# Patient Record
Sex: FEMALE | Race: BLACK OR AFRICAN AMERICAN | NOT HISPANIC OR LATINO | Employment: FULL TIME | URBAN - METROPOLITAN AREA
[De-identification: names, ages, dates, MRNs, and addresses within clinical notes are randomized per-mention and may not be internally consistent; named-entity substitution may affect disease eponyms.]

---

## 2020-12-08 ENCOUNTER — TRANSCRIBE ORDERS (OUTPATIENT)
Dept: PHYSICAL THERAPY | Facility: CLINIC | Age: 45
End: 2020-12-08

## 2020-12-08 DIAGNOSIS — M54.50 LOW BACK PAIN, UNSPECIFIED BACK PAIN LATERALITY, UNSPECIFIED CHRONICITY, UNSPECIFIED WHETHER SCIATICA PRESENT: Primary | ICD-10-CM

## 2020-12-08 DIAGNOSIS — M25.522 LEFT ELBOW PAIN: ICD-10-CM

## 2020-12-14 ENCOUNTER — TREATMENT (OUTPATIENT)
Dept: PHYSICAL THERAPY | Facility: CLINIC | Age: 45
End: 2020-12-14

## 2020-12-14 DIAGNOSIS — M25.522 LEFT ELBOW PAIN: ICD-10-CM

## 2020-12-14 DIAGNOSIS — M54.50 ACUTE BILATERAL LOW BACK PAIN WITHOUT SCIATICA: Primary | ICD-10-CM

## 2020-12-14 PROCEDURE — 97530 THERAPEUTIC ACTIVITIES: CPT | Performed by: PHYSICAL THERAPIST

## 2020-12-14 PROCEDURE — 97110 THERAPEUTIC EXERCISES: CPT | Performed by: PHYSICAL THERAPIST

## 2020-12-14 PROCEDURE — 97161 PT EVAL LOW COMPLEX 20 MIN: CPT | Performed by: PHYSICAL THERAPIST

## 2020-12-14 NOTE — PROGRESS NOTES
"Physical Therapy Initial Evaluation and Plan of Care    Patient: Anna Bull   : 1975  Diagnosis/ICD-10 Code:  Acute bilateral low back pain without sciatica [M54.5]  Referring practitioner: Mik Flowers MD    Subjective Evaluation    History of Present Illness  Date of onset: 12/3/2020  Mechanism of injury: (Patient's daughter is present to translate)  Patient slipped on oil as she was leaving work; she fell directly onto her bottom with her (L) elbow striking the ground. Patient presented to [facility] med a few hours later.  Xrays were taken of lumbar region which were negative.  She was told to apply ice to the (L) elbow.  Medication was prescribed which has been helpful.  She was placed on restrictions at work.  She states she worked light duty restrictions for one day only and was placed back \"on the line\" for her regular work. She is having difficulty sitting and standing for prolonged periods as she is required to for her job.    Patient reports a previous back surgery in 2019 but she is unable to describe the procedure; she indicates her upper thoracic region.      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  Location: (B) lumbar region, (B) proximal gluteals; (L) medial elbow, proximal forearm  Quality: \"strong\"  Relieving factors: medications and change in position  Aggravating factors: standing, ambulation, prolonged positioning, repetitive movement, lifting, movement, overhead activity, sleeping and stairs (having a difficult time finding a comfortable position, bending forward)  Progression: worsening    Social Support  Lives with: adult children and young children    Hand dominance: right    Diagnostic Tests  X-ray: normal    Patient " "Goals  Patient goals for therapy: decreased pain and return to work             Subjective Questionnaire: Oswestry: 20/50 = 40%; LEFS 17/80    Objective          Tenderness     Additional Tenderness Details  Mod (+) TTP (L) medial proximal forearm  Diffuse max (+) TTP (L) > (R) lumbar region including spinous processes, paraspinals    Active Range of Motion     Left Elbow   Flexion: 133 degrees with pain  Extension: 18 degrees   Forearm supination: 74 degrees with pain  Forearm pronation: 78 degrees     Right Elbow   Flexion: 147 degrees   Extension: 0 degrees   Forearm supination: 68 degrees   Forearm pronation: 81 degrees     Additional Active Range of Motion Details  Lumbar AROM (%):  Flexion 25% with pain  Right sidebend 25% with pain  Left sidebend 10% with pain (\"afraid to try\")  Right rotation 25% with pain  Left rotation 25% with pain      Strength/Myotome Testing     Left Hip   Planes of Motion   Flexion: 4-    Right Hip   Planes of Motion   Flexion: 4-    Left Knee   Flexion: 4-  Extension: 4    Right Knee   Flexion: 4  Extension: 4    Left Ankle/Foot   Dorsiflexion: 4+    Right Ankle/Foot   Dorsiflexion: 4+          Assessment & Plan     Assessment  Impairments: abnormal coordination, abnormal or restricted ROM, activity intolerance, lacks appropriate home exercise program and pain with function  Assessment details: Patient is a 45 y.o female who reports onset of lumbar and (L) elbow/forearm pain at work on 12/03/2020 when she slipped on oil.  She reports (L) > (R) lumbar symptoms rated 7/10 and (L) elbow/proximal FA symptoms 0-5/10.  Lumbar symptoms are worse with prolonged sitting, prolonged standing, walking, and bending while (L) elbow symptoms are worse with movement and lifting/carrying.  She exhibits diffuse lumbar and moderate (L) elbow/forearm tenderness, decreased and painful AROM lumbar > elbow, decreased strength, decreased positional tolerance, and increased time required to transition from " sitting to standing.  Her Oswestry score is 40% indicating a significant perceived level of physical disability.  She will benefit from skilled PT services to address these deficits and assist patient in painfree return to regular work tasks.  Prognosis: good  Functional Limitations: carrying objects, lifting, sleeping, walking, pulling, pushing, uncomfortable because of pain, moving in bed, sitting, standing, stooping and unable to perform repetitive tasks  Goals  Plan Goals: STGs: to be met in 2 weeks  1. Patient will be independent with initial HEP  2. Patient will report improved lumbar symptoms 3-5/10 and (L) elbow/forearm symptoms 0-3/10 for improved tolerance to ADLs  3. Patient will report consistently sleeping without waking more than twice due to pain interruption for improved restorative healing  4. Patient will demonstrate improved lumbar AROM >/= 50% all planes for improved trunk mobility  5. Patient will demonstrate full painfree AROM of (L) elbow/wrist/hand for improved functional use of (L) UE    LTGs: to be met in 4 weeks  1. Patient will be independent with progressed HEP  2. Patient will report improved lumbar symptoms 0-2/10 and resolution of (L) UE symptoms for normal ADL tolerance  3. Patient will demonstrate functional lumbar AROM and UE/LE strength for normal ADLs  4. Patient will ambulate short community distances without symptom exacerbation > 2/10  5. Patient will demonstrate readiness to RTW without restrictions    Plan  Therapy options: will be seen for skilled physical therapy services  Planned modality interventions: cryotherapy, thermotherapy (hydrocollator packs) and electrical stimulation/Russian stimulation  Planned therapy interventions: ADL retraining, body mechanics training, flexibility, functional ROM exercises, home exercise program, joint mobilization, manual therapy, motor coordination training, neuromuscular re-education, postural training, soft tissue mobilization,  spinal/joint mobilization, strengthening, stretching and therapeutic activities  Duration in visits: 10  Treatment plan discussed with: patient        Manual Therapy:         mins  24358;  Therapeutic Exercise:    16     mins  41789;     Neuromuscular Deloris:        mins  00590;    Therapeutic Activity:     11     mins  46737;     Gait Training:           mins  46525;     Ultrasound:          mins  05420;    Electrical Stimulation:         mins  28482 ( );  Dry Needling          mins self-pay    Timed Treatment:   27   mins   Total Treatment:     63   mins    PT SIGNATURE: Jennifer Lopes PT, DPT   DATE TREATMENT INITIATED: 12/14/2020    Initial Certification  Certification Period: 3/14/2021  I certify that the therapy services are furnished while this patient is under my care.  The services outlined above are required by this patient, and will be reviewed every 90 days.     PHYSICIAN: Mik Flowers MD      DATE:     Please sign and return via fax to [phone]. Thank you, [facility].

## 2020-12-16 ENCOUNTER — TREATMENT (OUTPATIENT)
Dept: PHYSICAL THERAPY | Facility: CLINIC | Age: 45
End: 2020-12-16

## 2020-12-16 DIAGNOSIS — M54.50 ACUTE BILATERAL LOW BACK PAIN WITHOUT SCIATICA: Primary | ICD-10-CM

## 2020-12-16 DIAGNOSIS — M25.522 LEFT ELBOW PAIN: ICD-10-CM

## 2020-12-16 PROCEDURE — 97530 THERAPEUTIC ACTIVITIES: CPT | Performed by: PHYSICAL THERAPIST

## 2020-12-16 PROCEDURE — 97110 THERAPEUTIC EXERCISES: CPT | Performed by: PHYSICAL THERAPIST

## 2020-12-16 NOTE — PROGRESS NOTES
"Physical Therapy Progress Note    12/16/2020  Mik Flowers MD    Re: Anna Bull  ________________________________________________________________    Visit # 2    Mrs. Anna Bull, has attended 2/2 PT sessions.  Treatment has consisted of: patient education, therapeutic activity, therapeutic exercise, and modalities.      S: Mrs. Anna Bull states: \"My body is not feeling any better.  My [left arm] hurts really bad from working yesterday.  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  I was having to stand while I did that.  My back hurts a lot.  I want another xray because something is wrong in my back.\" Patient's daughter is present to translate and reports that she does not believe her mother has been compliant with HEP.     Subjective Evaluation    Pain  Pain scale: 6/10 (L) medial elbow/proximal forearm; 8/10 (B) lumbar region.         *OBJECTIVE MEASUREMENTS TAKEN FROM INITIAL EVALUATION 12/14/2020    Objective          Tenderness     Additional Tenderness Details  Mod (+) TTP (L) medial proximal forearm  Diffuse max (+) TTP (L) > (R) lumbar region including spinous processes, paraspinals    Active Range of Motion     Left Elbow   Flexion: 133 degrees with pain  Extension: 18 degrees   Forearm supination: 74 degrees with pain  Forearm pronation: 78 degrees     Right Elbow   Flexion: 147 degrees   Extension: 0 degrees   Forearm supination: 68 degrees   Forearm pronation: 81 degrees     Additional Active Range of Motion Details  Lumbar AROM (%):  Flexion 25% with pain  Right sidebend 25% with pain  Left sidebend 10% with pain (\"afraid to try\")  Right rotation 25% with pain  Left rotation 25% with pain      Strength/Myotome Testing     Left Hip   Planes of Motion   Flexion: 4-    Right Hip   Planes of Motion   Flexion: 4-    Left Knee   Flexion: 4-  Extension: 4    Right Knee   Flexion: 4  Extension: 4    Left Ankle/Foot   Dorsiflexion: 4+    Right Ankle/Foot "   Dorsiflexion: 4+      See Exercise, Manual, and Modality Logs for complete treatment.       Assessment & Plan     Assessment  Assessment details: Patient has been compliant with PT efforts but has experienced no symptom relief with limited duration of services thus far.  She reports high pain levels in lumbar > (L) UE regions.  Lumbar AROM is very limited by pain and transitional movements require increased time to perform.  Patient tends to unweight (L) hip in sitting.  She will benefit from improved ROM for normal trunk and (L) UE mobility but has made limited progress toward PT goals thus far.        Other - to MD following PT appointment today.         Manual Therapy:         mins  11750;  Therapeutic Exercise:    24     mins  73491;     Neuromuscular Deloris:        mins  94520;    Therapeutic Activity:     11     mins  42160;     Gait Training:           mins  12750;     Ultrasound:          mins  71369;    Electrical Stimulation:         mins  94425 ( );  Dry Needling          mins self-pay    Timed Treatment:   35   mins   Total Treatment:     45   mins    Jennifer Lopes PT, DPT  Physical Therapist  [place] License # [id number]

## 2021-01-04 ENCOUNTER — TREATMENT (OUTPATIENT)
Dept: PHYSICAL THERAPY | Facility: CLINIC | Age: 46
End: 2021-01-04

## 2021-01-04 DIAGNOSIS — M54.50 ACUTE BILATERAL LOW BACK PAIN WITHOUT SCIATICA: ICD-10-CM

## 2021-01-04 DIAGNOSIS — M25.522 LEFT ELBOW PAIN: Primary | ICD-10-CM

## 2021-01-04 PROCEDURE — 97110 THERAPEUTIC EXERCISES: CPT | Performed by: PHYSICAL THERAPIST

## 2021-01-04 NOTE — PROGRESS NOTES
"Physical Therapy Daily Progress Note      Visit # 3      Subjective Evaluation    History of Present Illness    Subjective comment: Pt (as translated by her daugther) states that her \"back is better\" and her \"elbow is good\".       Objective   See Exercise, Manual, and Modality Logs for complete treatment.       Assessment & Plan     Assessment  Assessment details: Pt completed treatment with no verbal c/o pain.  She displayed visible signs of discomfort during lumbar exercises.  She had no issues with any of her elbow exercises.  Although pt reports that her lumbar is better, she still reports pain level of 5/10.  She denies any elbow pain.                      Manual Therapy:    0     mins  55986;  Therapeutic Exercise:    23    mins  15179;     Neuromuscular Deloris:    0    mins  92857;    Therapeutic Activity:     0     mins  87282;     Gait Trainin     mins  53048;     Ultrasound:     0     mins  08483;    Work Hardening           0      mins 78615  Iontophoresis               0   mins 02342  E-Stim                          _0_ mins 18502 ( )    Timed Treatment:   23   mins   Total Treatment:     34   mins    Kar Hankins PTA  Physical Therapist Assistant  "

## 2021-03-29 ENCOUNTER — DOCUMENTATION (OUTPATIENT)
Dept: PHYSICAL THERAPY | Facility: CLINIC | Age: 46
End: 2021-03-29